# Patient Record
Sex: FEMALE | Race: OTHER | NOT HISPANIC OR LATINO
[De-identification: names, ages, dates, MRNs, and addresses within clinical notes are randomized per-mention and may not be internally consistent; named-entity substitution may affect disease eponyms.]

---

## 2017-07-20 PROBLEM — Z00.00 ENCOUNTER FOR PREVENTIVE HEALTH EXAMINATION: Status: ACTIVE | Noted: 2017-07-20

## 2017-08-10 ENCOUNTER — APPOINTMENT (OUTPATIENT)
Dept: HEART AND VASCULAR | Facility: CLINIC | Age: 57
End: 2017-08-10
Payer: MEDICARE

## 2017-08-10 VITALS
HEIGHT: 63.5 IN | WEIGHT: 118 LBS | BODY MASS INDEX: 20.65 KG/M2 | SYSTOLIC BLOOD PRESSURE: 124 MMHG | DIASTOLIC BLOOD PRESSURE: 80 MMHG

## 2017-08-10 VITALS — TEMPERATURE: 98.4 F | HEART RATE: 64 BPM

## 2017-08-10 DIAGNOSIS — Z01.810 ENCOUNTER FOR PREPROCEDURAL CARDIOVASCULAR EXAMINATION: ICD-10-CM

## 2017-08-10 DIAGNOSIS — E78.5 HYPERLIPIDEMIA, UNSPECIFIED: ICD-10-CM

## 2017-08-10 DIAGNOSIS — Z87.898 PERSONAL HISTORY OF OTHER SPECIFIED CONDITIONS: ICD-10-CM

## 2017-08-10 DIAGNOSIS — Z82.49 FAMILY HISTORY OF ISCHEMIC HEART DISEASE AND OTHER DISEASES OF THE CIRCULATORY SYSTEM: ICD-10-CM

## 2017-08-10 DIAGNOSIS — M81.0 AGE-RELATED OSTEOPOROSIS W/OUT CURRENT PATHOLOGICAL FRACTURE: ICD-10-CM

## 2017-08-10 DIAGNOSIS — R06.09 OTHER FORMS OF DYSPNEA: ICD-10-CM

## 2017-08-10 DIAGNOSIS — J45.909 UNSPECIFIED ASTHMA, UNCOMPLICATED: ICD-10-CM

## 2017-08-10 DIAGNOSIS — R94.39 ABNORMAL RESULT OF OTHER CARDIOVASCULAR FUNCTION STUDY: ICD-10-CM

## 2017-08-10 DIAGNOSIS — R07.89 OTHER CHEST PAIN: ICD-10-CM

## 2017-08-10 PROCEDURE — 99205 OFFICE O/P NEW HI 60 MIN: CPT | Mod: 25

## 2017-08-10 RX ORDER — ALBUTEROL SULFATE 90 UG/1
108 (90 BASE) AEROSOL, METERED RESPIRATORY (INHALATION)
Qty: 8 | Refills: 0 | Status: ACTIVE | COMMUNITY
Start: 2017-06-23

## 2017-08-10 RX ORDER — BUDESONIDE AND FORMOTEROL FUMARATE DIHYDRATE 160; 4.5 UG/1; UG/1
160-4.5 AEROSOL RESPIRATORY (INHALATION)
Qty: 10 | Refills: 0 | Status: ACTIVE | COMMUNITY
Start: 2016-12-22

## 2017-08-10 RX ORDER — IBANDRONATE SODIUM 150 MG/1
150 TABLET ORAL
Qty: 1 | Refills: 0 | Status: DISCONTINUED | COMMUNITY
Start: 2017-03-07

## 2017-08-10 RX ORDER — GABAPENTIN 100 MG/1
100 CAPSULE ORAL
Qty: 30 | Refills: 0 | Status: ACTIVE | COMMUNITY
Start: 2017-06-13

## 2017-08-10 RX ORDER — BACLOFEN 10 MG/1
10 TABLET ORAL
Qty: 90 | Refills: 0 | Status: ACTIVE | COMMUNITY
Start: 2017-06-13

## 2017-08-10 RX ORDER — APREMILAST 30 MG/1
30 TABLET, FILM COATED ORAL
Qty: 180 | Refills: 0 | Status: ACTIVE | COMMUNITY
Start: 2017-03-31

## 2017-08-10 RX ORDER — NYSTATIN 100000 [USP'U]/G
100000 CREAM TOPICAL
Qty: 60 | Refills: 0 | Status: ACTIVE | COMMUNITY
Start: 2017-07-26

## 2017-08-10 RX ORDER — ALENDRONATE SODIUM 70 MG/1
70 TABLET ORAL
Refills: 0 | Status: ACTIVE | COMMUNITY

## 2017-08-10 RX ORDER — FLUOCINONIDE 0.5 MG/G
0.05 CREAM TOPICAL
Qty: 60 | Refills: 0 | Status: ACTIVE | COMMUNITY
Start: 2017-06-13

## 2017-08-10 RX ORDER — MELOXICAM 15 MG/1
15 TABLET ORAL
Qty: 30 | Refills: 0 | Status: ACTIVE | COMMUNITY
Start: 2017-06-13

## 2017-08-10 RX ORDER — DOXYCYCLINE HYCLATE 100 MG/1
100 TABLET, DELAYED RELEASE ORAL
Qty: 28 | Refills: 0 | Status: DISCONTINUED | COMMUNITY
Start: 2017-02-07

## 2017-08-10 RX ORDER — SECUKINUMAB 150 MG/ML
150 INJECTION SUBCUTANEOUS
Qty: 4 | Refills: 0 | Status: ACTIVE | COMMUNITY
Start: 2016-12-13

## 2017-09-15 ENCOUNTER — APPOINTMENT (OUTPATIENT)
Dept: RADIOLOGY | Facility: CLINIC | Age: 57
End: 2017-09-15

## 2017-09-19 ENCOUNTER — APPOINTMENT (OUTPATIENT)
Dept: ORTHOPEDIC SURGERY | Facility: CLINIC | Age: 57
End: 2017-09-19

## 2017-10-17 ENCOUNTER — APPOINTMENT (OUTPATIENT)
Dept: ORTHOPEDIC SURGERY | Facility: CLINIC | Age: 57
End: 2017-10-17
Payer: MEDICARE

## 2017-10-17 ENCOUNTER — APPOINTMENT (OUTPATIENT)
Dept: RADIOLOGY | Facility: CLINIC | Age: 57
End: 2017-10-17

## 2017-10-17 VITALS — HEIGHT: 63.5 IN | BODY MASS INDEX: 20.65 KG/M2 | WEIGHT: 118 LBS

## 2017-10-17 DIAGNOSIS — Z87.09 PERSONAL HISTORY OF OTHER DISEASES OF THE RESPIRATORY SYSTEM: ICD-10-CM

## 2017-10-17 DIAGNOSIS — M19.012 PRIMARY OSTEOARTHRITIS, LEFT SHOULDER: ICD-10-CM

## 2017-10-17 DIAGNOSIS — M19.011 PRIMARY OSTEOARTHRITIS, RIGHT SHOULDER: ICD-10-CM

## 2017-10-17 DIAGNOSIS — Z87.2 PERSONAL HISTORY OF DISEASES OF THE SKIN AND SUBCUTANEOUS TISSUE: ICD-10-CM

## 2017-10-17 DIAGNOSIS — Z82.62 FAMILY HISTORY OF OSTEOPOROSIS: ICD-10-CM

## 2017-10-17 DIAGNOSIS — Z87.19 PERSONAL HISTORY OF OTHER DISEASES OF THE DIGESTIVE SYSTEM: ICD-10-CM

## 2017-10-17 DIAGNOSIS — Z87.39 PERSONAL HISTORY OF OTHER DISEASES OF THE MUSCULOSKELETAL SYSTEM AND CONNECTIVE TISSUE: ICD-10-CM

## 2017-10-17 DIAGNOSIS — L40.50 ARTHROPATHIC PSORIASIS, UNSPECIFIED: ICD-10-CM

## 2017-10-17 PROCEDURE — 99203 OFFICE O/P NEW LOW 30 MIN: CPT

## 2017-10-17 RX ORDER — ATORVASTATIN CALCIUM 10 MG/1
10 TABLET, FILM COATED ORAL
Qty: 30 | Refills: 0 | Status: DISCONTINUED | COMMUNITY
Start: 2017-03-03 | End: 2017-10-17

## 2017-10-17 RX ORDER — RABEPRAZOLE SODIUM 20 MG/1
20 TABLET, DELAYED RELEASE ORAL
Qty: 30 | Refills: 0 | Status: DISCONTINUED | COMMUNITY
Start: 2017-06-13 | End: 2017-10-17

## 2017-10-17 RX ORDER — DICLOFENAC SODIUM 10 MG/G
1 GEL TOPICAL
Qty: 100 | Refills: 0 | Status: DISCONTINUED | COMMUNITY
Start: 2017-07-24 | End: 2017-10-17

## 2017-10-18 PROBLEM — Z87.19 HISTORY OF ESOPHAGEAL REFLUX: Status: RESOLVED | Noted: 2017-10-17 | Resolved: 2017-10-18

## 2017-10-18 PROBLEM — Z87.09 HISTORY OF ASTHMA: Status: RESOLVED | Noted: 2017-10-17 | Resolved: 2017-10-18

## 2017-10-18 PROBLEM — Z82.62 FAMILY HISTORY OF OSTEOPOROSIS: Status: ACTIVE | Noted: 2017-10-17

## 2017-10-18 PROBLEM — Z87.39 HISTORY OF ARTHRITIS: Status: RESOLVED | Noted: 2017-10-17 | Resolved: 2017-10-18

## 2017-10-18 RX ORDER — RABEPRAZOLE SODIUM 20 MG/1
TABLET, DELAYED RELEASE ORAL
Refills: 0 | Status: ACTIVE | COMMUNITY

## 2017-11-04 PROBLEM — M19.011 PRIMARY OSTEOARTHRITIS OF RIGHT SHOULDER: Status: ACTIVE | Noted: 2017-10-17

## 2017-11-04 PROBLEM — M19.012 PRIMARY OSTEOARTHRITIS OF LEFT SHOULDER: Status: ACTIVE | Noted: 2017-10-17

## 2017-11-04 PROBLEM — L40.50 PSORIATIC ARTHRITIS: Status: ACTIVE | Noted: 2017-10-18

## 2024-02-21 ENCOUNTER — EMERGENCY (EMERGENCY)
Facility: HOSPITAL | Age: 64
LOS: 1 days | Discharge: HOME | End: 2024-02-21
Attending: EMERGENCY MEDICINE | Admitting: EMERGENCY MEDICINE
Payer: MEDICARE

## 2024-02-21 VITALS
HEART RATE: 60 BPM | SYSTOLIC BLOOD PRESSURE: 123 MMHG | TEMPERATURE: 98 F | OXYGEN SATURATION: 100 % | DIASTOLIC BLOOD PRESSURE: 63 MMHG | RESPIRATION RATE: 18 BRPM

## 2024-02-21 VITALS
DIASTOLIC BLOOD PRESSURE: 74 MMHG | TEMPERATURE: 98 F | OXYGEN SATURATION: 100 % | RESPIRATION RATE: 15 BRPM | HEART RATE: 63 BPM | SYSTOLIC BLOOD PRESSURE: 121 MMHG

## 2024-02-21 DIAGNOSIS — Z96.642 PRESENCE OF LEFT ARTIFICIAL HIP JOINT: Chronic | ICD-10-CM

## 2024-02-21 DIAGNOSIS — Z96.652 PRESENCE OF LEFT ARTIFICIAL KNEE JOINT: Chronic | ICD-10-CM

## 2024-02-21 DIAGNOSIS — Z96.641 PRESENCE OF RIGHT ARTIFICIAL HIP JOINT: Chronic | ICD-10-CM

## 2024-02-21 DIAGNOSIS — Z96.651 PRESENCE OF RIGHT ARTIFICIAL KNEE JOINT: Chronic | ICD-10-CM

## 2024-02-21 DIAGNOSIS — Z90.49 ACQUIRED ABSENCE OF OTHER SPECIFIED PARTS OF DIGESTIVE TRACT: Chronic | ICD-10-CM

## 2024-02-21 LAB
ALBUMIN SERPL ELPH-MCNC: 3.6 G/DL — SIGNIFICANT CHANGE UP (ref 3.3–5)
ALP SERPL-CCNC: 117 U/L — SIGNIFICANT CHANGE UP (ref 40–120)
ALT FLD-CCNC: 17 U/L — SIGNIFICANT CHANGE UP (ref 4–33)
ANION GAP SERPL CALC-SCNC: 10 MMOL/L — SIGNIFICANT CHANGE UP (ref 7–14)
AST SERPL-CCNC: 13 U/L — SIGNIFICANT CHANGE UP (ref 4–32)
BASOPHILS # BLD AUTO: 0.05 K/UL — SIGNIFICANT CHANGE UP (ref 0–0.2)
BASOPHILS NFR BLD AUTO: 0.7 % — SIGNIFICANT CHANGE UP (ref 0–2)
BILIRUB SERPL-MCNC: 0.2 MG/DL — SIGNIFICANT CHANGE UP (ref 0.2–1.2)
BUN SERPL-MCNC: 24 MG/DL — HIGH (ref 7–23)
CALCIUM SERPL-MCNC: 9.2 MG/DL — SIGNIFICANT CHANGE UP (ref 8.4–10.5)
CHLORIDE SERPL-SCNC: 106 MMOL/L — SIGNIFICANT CHANGE UP (ref 98–107)
CO2 SERPL-SCNC: 24 MMOL/L — SIGNIFICANT CHANGE UP (ref 22–31)
CREAT SERPL-MCNC: 0.47 MG/DL — LOW (ref 0.5–1.3)
CRP SERPL-MCNC: <3 MG/L — SIGNIFICANT CHANGE UP
EGFR: 106 ML/MIN/1.73M2 — SIGNIFICANT CHANGE UP
EOSINOPHIL # BLD AUTO: 0.25 K/UL — SIGNIFICANT CHANGE UP (ref 0–0.5)
EOSINOPHIL NFR BLD AUTO: 3.3 % — SIGNIFICANT CHANGE UP (ref 0–6)
ERYTHROCYTE [SEDIMENTATION RATE] IN BLOOD: 33 MM/HR — HIGH (ref 4–25)
FLUAV AG NPH QL: SIGNIFICANT CHANGE UP
FLUBV AG NPH QL: SIGNIFICANT CHANGE UP
GLUCOSE SERPL-MCNC: 94 MG/DL — SIGNIFICANT CHANGE UP (ref 70–99)
HCT VFR BLD CALC: 39.2 % — SIGNIFICANT CHANGE UP (ref 34.5–45)
HGB BLD-MCNC: 12.4 G/DL — SIGNIFICANT CHANGE UP (ref 11.5–15.5)
IANC: 4.02 K/UL — SIGNIFICANT CHANGE UP (ref 1.8–7.4)
IMM GRANULOCYTES NFR BLD AUTO: 0.5 % — SIGNIFICANT CHANGE UP (ref 0–0.9)
LYMPHOCYTES # BLD AUTO: 2.75 K/UL — SIGNIFICANT CHANGE UP (ref 1–3.3)
LYMPHOCYTES # BLD AUTO: 36.7 % — SIGNIFICANT CHANGE UP (ref 13–44)
MCHC RBC-ENTMCNC: 28.3 PG — SIGNIFICANT CHANGE UP (ref 27–34)
MCHC RBC-ENTMCNC: 31.6 GM/DL — LOW (ref 32–36)
MCV RBC AUTO: 89.5 FL — SIGNIFICANT CHANGE UP (ref 80–100)
MONOCYTES # BLD AUTO: 0.39 K/UL — SIGNIFICANT CHANGE UP (ref 0–0.9)
MONOCYTES NFR BLD AUTO: 5.2 % — SIGNIFICANT CHANGE UP (ref 2–14)
NEUTROPHILS # BLD AUTO: 4.02 K/UL — SIGNIFICANT CHANGE UP (ref 1.8–7.4)
NEUTROPHILS NFR BLD AUTO: 53.6 % — SIGNIFICANT CHANGE UP (ref 43–77)
NRBC # BLD: 0 /100 WBCS — SIGNIFICANT CHANGE UP (ref 0–0)
NRBC # FLD: 0 K/UL — SIGNIFICANT CHANGE UP (ref 0–0)
PLATELET # BLD AUTO: 354 K/UL — SIGNIFICANT CHANGE UP (ref 150–400)
POTASSIUM SERPL-MCNC: 4.7 MMOL/L — SIGNIFICANT CHANGE UP (ref 3.5–5.3)
POTASSIUM SERPL-SCNC: 4.7 MMOL/L — SIGNIFICANT CHANGE UP (ref 3.5–5.3)
PROT SERPL-MCNC: 6.7 G/DL — SIGNIFICANT CHANGE UP (ref 6–8.3)
RBC # BLD: 4.38 M/UL — SIGNIFICANT CHANGE UP (ref 3.8–5.2)
RBC # FLD: 15 % — HIGH (ref 10.3–14.5)
RSV RNA NPH QL NAA+NON-PROBE: SIGNIFICANT CHANGE UP
SARS-COV-2 RNA SPEC QL NAA+PROBE: DETECTED
SODIUM SERPL-SCNC: 140 MMOL/L — SIGNIFICANT CHANGE UP (ref 135–145)
WBC # BLD: 7.5 K/UL — SIGNIFICANT CHANGE UP (ref 3.8–10.5)
WBC # FLD AUTO: 7.5 K/UL — SIGNIFICANT CHANGE UP (ref 3.8–10.5)

## 2024-02-21 PROCEDURE — 73700 CT LOWER EXTREMITY W/O DYE: CPT | Mod: 26,RT,MA

## 2024-02-21 PROCEDURE — 99285 EMERGENCY DEPT VISIT HI MDM: CPT | Mod: FS

## 2024-02-21 RX ORDER — KETOROLAC TROMETHAMINE 30 MG/ML
15 SYRINGE (ML) INJECTION ONCE
Refills: 0 | Status: DISCONTINUED | OUTPATIENT
Start: 2024-02-21 | End: 2024-02-21

## 2024-02-21 RX ORDER — MORPHINE SULFATE 50 MG/1
2 CAPSULE, EXTENDED RELEASE ORAL ONCE
Refills: 0 | Status: DISCONTINUED | OUTPATIENT
Start: 2024-02-21 | End: 2024-02-21

## 2024-02-21 RX ORDER — SODIUM CHLORIDE 9 MG/ML
500 INJECTION INTRAMUSCULAR; INTRAVENOUS; SUBCUTANEOUS ONCE
Refills: 0 | Status: COMPLETED | OUTPATIENT
Start: 2024-02-21 | End: 2024-02-21

## 2024-02-21 RX ORDER — ACETAMINOPHEN 500 MG
1000 TABLET ORAL ONCE
Refills: 0 | Status: DISCONTINUED | OUTPATIENT
Start: 2024-02-21 | End: 2024-02-24

## 2024-02-21 RX ADMIN — Medication 15 MILLIGRAM(S): at 12:23

## 2024-02-21 RX ADMIN — SODIUM CHLORIDE 500 MILLILITER(S): 9 INJECTION INTRAMUSCULAR; INTRAVENOUS; SUBCUTANEOUS at 10:36

## 2024-02-21 NOTE — ED PROVIDER NOTE - OBJECTIVE STATEMENT
64 y/o female with PMH psoriatic arthritis, rheumatoid arthritis, asthma, and GERD with PSH of b/l hip and knee replacement, R hip replacement 11/2023 presents with atraumatic right hip pain since this morning that radiates to the groin. Pt reports she was woken up at 5am with a sharp pain that she has never felt before but denies any recent falls or trauma to the area and reports she does not move in her sleep due to arthritis in multiple joints. At baseline patient reports she has not been able to walk without 2 people supporting her during physical therapy since the hip surgery in November and has not tried to ambulate this morning. Patient reports she was told she had a right hip fracture 1/19/24 and has been receiving tylenol and tramadol in her nursing facility she resides in for pain. Patient is unsure what medication she was given this morning for pain. Patient admits she had a recent URI and finished course of unknown antibiotics 4 days ago and course of steroids ended yesterday. Patient reports she currently has nasal congestion and productive cough. Pt also admits to right 2nd digit toe numbness that has been "going on for a while". Pt denies fever, chest pain, shortness of breath, abdominal pain, headache, dizziness, nausea, vomiting, diarrhea, back pain, numbness or tingling in the extremities. Pt reports she was woken up at 5am with a sharp pain that she has never felt before but denies any recent falls or trauma to the area and reports she does not move in her sleep due to arthritis in multiple joints. At baseline patient reports she has not been able to walk without 2 people supporting her during physical therapy since the hip surgery in November and has not tried to ambulate this morning. Patient reports she was told she had a right hip fracture 1/19/24 and has been receiving tylenol and tramadol in her nursing facility she resides in for pain. Patient is unsure what medication she was given this morning for pain. Patient admits she had a recent URI and finished course of unknown antibiotics 4 days ago and course of steroids ended yesterday. Patient reports she currently has nasal congestion and productive cough. Pt also admits to right 2nd digit toe numbness that has been "going on for a while". Pt denies fever, chest pain, shortness of breath, abdominal pain, headache, dizziness, nausea, vomiting, diarrhea, back pain, numbness or tingling in the extremities.    Supervising Statement (LUZ Baumann): I have personally seen and examined this patient.  I have fully participated in the care of this patient. I have reviewed all pertinent clinical information, including history, physical exam, plan and PA student’s note and agree as noted.  62 y/o female with PMH psoriatic arthritis, rheumatoid arthritis, asthma, and GERD with PSH of b/l hip and knee replacement, R hip replacement 11/2023 presents with atraumatic right hip pain since this morning that radiates to the groin. atraumatic, starting at 5AM. on tylenol/tramadol for pain at the rehab facility. denies fevers, but was recently tx for URI with steroids/antibiotics. Pt not ambulatory, and currently on no treatment for her psoriatic/rheumatoid arthritis.   on exam, pt mildly contacted. + ttp with gentle palpation overlying R greater trochanter. limited ROM 2/2 pain. scar visualized overlying L hip. no overlying erythema or purulent drainage. NSR no murmurs. lungs CTA b/l. will check labs, ESR/CRP, CT to evaluate for fracture vs hardware malfunction, analgesics, reassess

## 2024-02-21 NOTE — ED PROVIDER NOTE - NSFOLLOWUPINSTRUCTIONS_ED_ALL_ED_FT
You were seen in our department for Hip pain.  Your CT scan is concerning for possible loosening hardware, although we spoke w/ Dr. Bishop, who does not recommend any intervention at this time.   FOllow up for your scheduled appt with Dr. Bishop at his [place] office at 10AM on 2/29.  continue home medications.  Follow up with your PMD in 48-72 hours for further monitoring.  if you develop any chest pain, dizziness, high fevers, weakness, numbness, tingling, vision changes, or any worsening symptoms return to our ED for evaluation.

## 2024-02-21 NOTE — ED PROVIDER NOTE - CLINICAL SUMMARY MEDICAL DECISION MAKING FREE TEXT BOX
Lauren: Glenn. hip replacements. Awoke w/ non-traumatic R hip pain. No F. Recent URI. Just finished short steroid and ABx course. Lives in nursing home. Check CT. If neg., then trial of walking. Consider also reactive arthritis after URI. Check CBC, CMP, ESR, CRP, uric acid. Pain meds.

## 2024-02-21 NOTE — ED PROVIDER NOTE - NSICDXPASTSURGICALHX_GEN_ALL_CORE_FT
PAST SURGICAL HISTORY:  History of appendectomy     S/P hip replacement, left     S/P hip replacement, right     S/P total knee replacement, left     S/P total knee replacement, right

## 2024-02-21 NOTE — ED ADULT NURSE NOTE - NSFALLHARMRISKINTERV_ED_ALL_ED

## 2024-02-21 NOTE — ED PROVIDER NOTE - HIV OFFER
DISPLAY PLAN FREE TEXT DISPLAY PLAN FREE TEXT DISPLAY PLAN FREE TEXT DISPLAY PLAN FREE TEXT DISPLAY PLAN FREE TEXT DISPLAY PLAN FREE TEXT DISPLAY PLAN FREE TEXT Opt out

## 2024-02-21 NOTE — ED PROVIDER NOTE - CARE PROVIDER_API CALL
REY BECKER  [address] [place] [address] [address] [place] [address]  Phone: [phone]  Fax: [phone]  Follow Up Time: Routine

## 2024-02-21 NOTE — ED ADULT NURSE NOTE - NSFALLCONCLUSION_ED_ALL_ED
OUTPATIENT PROGRESS NOTE    HISTORY  The patient is a 77 year old female who comes in for transitional care management visit.     Patient comes today with her friend Alicia.     Patient presented to ED on 1/8/2024 with right sided abdominal pain with history of appendectomy and a cholecystectomy.No abnormal bowel movements.  No urinary complaints.  Patient had right-sided pain and states feels like someone is stabbing her.  No fevers or chills.    ED work up  EKG evaluated by myself with the following findings:  Ventricular paced rhythm with a rate of 77 beats per minute.  Negative Sgarbossa criteria.   Urinalysis negative.   Creatinine slightly elevated other labs unremarkable.     CT abdomen/pelvis. 1/8/2024  IMPRESSION:  1. Diffuse wall thickening of the colon with surrounding inflammatory  stranding and small amount of fluid in the right pericolic gutter. The  findings suggest diffuse colitis.  2. Staghorn calculi of both kidneys with chronic left hydronephrosis and  new right hydronephrosis and hydroureter. There is also an 8 mm stone at  the right ureterovesicular junction.  3. Stable cyst on the right ovary/adnexa compared to studies of 6/12/2022.  4. Moderate gastric distention. There are no findings of bowel obstruction.  5. Chronic bronchiectatic changes and mild peribronchial thickening in the  lower lobes. This has improved in appearance compared to 8/4/2023. There is  no focal consolidation.  6. Area of masslike fullness in the left upper quadrant measuring 3.8 x 2.7  cm (image 22 of series 3 may represent a gastric diverticulum or even  possible mass in the wall of the stomach. This is likely separate from the  adrenal gland. It can be seen on studies from 6/12/2022 and was better  identified on the postcontrast images.    Hospital course admitted on 1/7/2024 to 1/16/2024.     Patient stay complicated with bilateral renal stones with chronic left hydronephrosis, UTI and right pyelonephritis.  Status  post bilateral ureteral stent placement 1/8/2024.  Patient also complaining of colitis and she was started on IV antibiotics.   Repeat CT of abdomen and pelvis with contrast 1/13/2024 reveals resolution of CT changes of RLQ colitis. patient was discharged home with vancomycin 125 mg QID for 2 weeks. Patient did develop some diarrhea inpatient, she was checked for C-diff which was negative. Patient inflammatory markers were elevated initially with CRP at 3.3 and improved by discharge at 0.7. blood culture were negative after 5 days.  Patient returned back on Afib medications with no recurrence of her colitis.  Patient will follow up with Urology outpatient. Patient reports that she has not followed up with Dr Sharp at this time, she is scheduled 1/29/24.     Blood cultures negative   Urine culture multiple bacteria  CRP was elevated but subsequently improved   Leukocytosis resolved.  No anemia.    CT abdomen and pelvis 1/13/2024  IMPRESSION:  1. Colon is no longer thick walled.   2. Staghorn calculi in both kidneys measuring up to 1.5 cm on the right and  3.0 cm on the left.  3. Bilateral nephroureteral stents. Mild persistent bilateral  hydronephrosis.  4. Prior cholecystectomy with moderate intrahepatic and extrahepatic  biliary ductal dilatation, likely reservoir effect.  5. Unchanged left upper quadrant adrenal cyst versus gastric diverticulum  measuring 2.5 x 4.5. This is unchanged dating back to at least 2010 and is  therefore benign    History of inappropriate sinus tachycardia. Patient was having palpation and racing heart sensation. Patient was noted to have increase heart rate on device check. Patient was started on Corlanor 5 mg bid by EP. Patient tolerating medication well and encouraged to follow up next month as schedule.     History of Adnexal mass enlargement. Patient elected not to proceed with further evaluation or work up. She was recommended to consult with gyn and declines.      MEDICATIONS  Medications were reviewed and updated today.  Current Outpatient Medications   Medication Sig    vancomycin (VANCOCIN) 125 MG capsule Take 1 capsule by mouth 4 times daily for 14 days.    levothyroxine 150 MCG tablet TAKE 1 TABLET BY MOUTH DAILY. EXCEPT 1/2 TABLET SAT/SUN    aspirin-acetaminophen-caffeine (EXCEDRIN MIGRAINE) 250-250-65 MG per tablet Take 1 tablet by mouth every 6 hours as needed for Pain.    metoPROLOL succinate (TOPROL-XL) 50 MG 24 hr tablet TAKE 1.5 TABLETS BY MOUTH EVERY 12 HOURS.    ivabradine (CORLANOR) 5 MG tablet Take 1 tablet by mouth in the morning and 1 tablet in the evening.    rabeprazole (ACIPHEX) 20 MG tablet TAKE 1 TABLET (20 MG TOTAL) BY MOUTH ONCE DAILY. INDICATIONS: GASTROESOPHAGEAL REFLUX DISEASE    AZO-CRANBERRY PO Take 1 tablet by mouth daily.    Entresto  MG per tablet TAKE 1 TABLET BY MOUTH IN THE MORNING AND IN THE EVENING    Omega-3 Fatty Acids (Fish Oil) 1200 MG capsule Take 1 capsule by mouth in the morning and 1 capsule in the evening.    calcium carbonate-cholecalciferol (Calcium + D3) 600-800 MG-UNIT tablet Take 1 tablet by mouth in the morning and 1 tablet in the evening.    acetaminophen (TYLENOL) 500 MG tablet Take 1 tablet by mouth every 6 hours as needed for Pain or Fever.     Current Facility-Administered Medications   Medication    DENOSumab (PROLIA) SubQ injection 60 mg       ALLERGIES  Allergies as of 01/23/2024 - Reviewed 01/23/2024   Allergen Reaction Noted    Chocolate   (food or med) Nausea & Vomiting 09/29/2011    Penicillins RASH 09/29/2011    Protonix HEADACHES        REVIEW OF SYSTEMS  See above for details.   Fatigue has improved   She is feeling better   General: No fatigue. No fever. No chills.  Cardiac: No chest pain. No palpitation. No syncope or near syncope. No orthopnea or paroxysmal nocturnal dyspnea.  Respiratory: No cough. No wheezing. No hemoptysis. No shortness of breath.  Gastrointestinal: No nausea. No vomiting. No  abdominal pain. No diarrhea. No rectal bleeding. No dysphagia.  Genitourinary: No hematuria. No flank pain. No dysuria. No irritative urinary symptoms. Normal stream.  Neurologic: No headache. No unilateral sensory or motor dysfunction. No dysarthria. No double vision.  Endocrine: No weight change. No dry skin. No palpitation. No hair loss. No constipation.  Skin: No rash, ulcers, or pruritus.  ENT: No runny nose. No sneezing. No nasal drainage.  Eyes: No drainage. No pain.  Mental: No anxiety or depression.    PHYSICAL EXAM  Vitals: Blood pressure 134/86, pulse 86, height 5' 6\" (1.676 m), weight (!) 40.8 kg (90 lb), SpO2 92 %.  Wt Readings from Last 3 Encounters:   01/23/24 (!) 40.8 kg (90 lb)   01/16/24 (!) 39.1 kg (86 lb 3.2 oz)   10/05/23 40.4 kg (89 lb)   Defibrillator ICD left upper chest subcutaneously.  ENT: Anicteric sclerae. No pallor. Oral mucosa moist. No ulcers, thrush or erythema.  General: Patient is alert, awake, oriented x3, not in acute distress, pleasant.  Neck: Neck supple. No lymphadenopathy. No jugular venous distention or bruits.  Cardiac: Regular rate and rhythm. No murmur, rubs or gallops. No extra sounds. Point of maximal impulse is normal.  Chest: Clear. No wheezing. No rales. Good air entry. No rhonchi.  Abdomen: Soft. Nontender. Nondistended. No hepatosplenomegaly.  Legs: Normal. No edema. No deep vein thrombosis.  Skin: Warm and dry with no rashes.    LABORATORY  I have reviewed the pertinent laboratory tests. These are the pertinent findings:  Lab Results   Component Value Date    HGBA1C 5.6 09/13/2021    CHOLESTEROL 195 09/07/2022    HDL 66 09/07/2022    CALCLDL 115 09/07/2022    TRIGLYCERIDE 70 09/07/2022    POTASSIUM 3.6 01/16/2024    CREATININE 0.63 01/16/2024    AST 21 01/08/2024    BILIRUBIN 0.9 01/08/2024    TSH 0.498 01/12/2024    WBC 7.1 01/14/2024    HGB 12.1 01/14/2024     01/14/2024    VITD25 73.8 09/07/2022     ASSESSMENT:  1. Med[employer]onic BiV ICD, generator change  withLV epicardial lead 9/14/2021,  ( non MRI, compatible, capped RV) the patient will keep appointment with cardiology and Dr. King from electrophysiology.  Doing very well.  She has no palpitations chest pain shortness O breath.   2. Ventricular tachycardia (CMS/HCC) no recurrence.  Monitor closely by Cardiology.  Currently maintained on metoprolol XL 75 mg b.i.d..  Corlanor 5 mg b.i.d..  Entresto  b.i.d..   3. Chronic systolic congestive heart failure (CMD) she has treatment as above.  Clinically euvolemic.  No orthopnea no PND.  Furosemide 20 mg p.r.n. only.   4. PAF (paroxysmal atrial fibrillation) (CMD) as above.   5. Colitis treated with vancomycin 125 mg q.i.d. for 14 days.  She will finish next week.  She has no diarrhea nausea vomiting or abdominal pain.   6. Adnexal mass this was discussed with the patient.  She is not interested in pursuing this.  She canceled the appointments with gyn few times.  She decided with the sister not to pursue this issue in the future.   7. Acute pyelonephritis treated with antibiotic.  Resolved.  Underwent bilateral stent placement by Urology.  She has an appointment next week with Dr. Sharp and she will keep appointment.   8. MELISSA (obstructive sleep apnea) she was seen Dr. Vasquez.  Sleep apnea testing was negative.  She is maintained on mandibular appliance as well as oxygen 2 L at nighttime.   9. Calculus of kidney as above underwent stent placement follow-up with Urology.   10.    Hypothyroidism.  Maintained on levothyroxine this will be maintained.    PLAN:    Return in about 3 months (around 4/23/2024).     Patient Instructions   Continue the oral vancomycin antibiotic to complete the course  Other medications stay the same  Low-salt diet  Use a walker  Fall precautions discussed with you today  Keep appointment with urology and cardiology as previously scheduled  See me in 3 months for wellness examination earlier if any issues    All the above was discussed  with the patient in detail; questions were answered to the patient's satisfaction.  Patient left the office in stable condition with verbal and written instructions.        Fall with Harm Risk

## 2024-02-21 NOTE — ED PROVIDER NOTE - ATTENDING APP SHARED VISIT CONTRIBUTION OF CARE
I performed a face-to-face evaluation of the patient and performed a history and physical examination. I agree with the history and physical examination. If this was a PA visit, I personally saw the patient with the PA and performed a substantive portion of the visit including all aspects of the medical decision making.     Bilat. hip replacements. Awoke w/ non-traumatic R hip pain. No F. Recent URI. Just finished short steroid and ABx course. Lives in nursing home. Check CT. If neg., then trial of walking. Consider also reactive arthritis after URI. Check CBC, CMP, ESR, CRP, uric acid. Pain meds.

## 2024-02-21 NOTE — ED PROVIDER NOTE - PATIENT PORTAL LINK FT
You can access the FollowMyHealth Patient Portal offered by [facility] by registering at the following website: [email or web address]. By joining [employer]’s FollowMyHealth portal, you will also be able to view your health information using other applications (apps) compatible with our system.

## 2024-02-21 NOTE — ED PROVIDER NOTE - NSICDXPASTMEDICALHX_GEN_ALL_CORE_FT
PAST MEDICAL HISTORY:  Asthma     GERD (gastroesophageal reflux disease)     Psoriatic arthritis     Rheumatoid arthritis

## 2024-02-21 NOTE — ED ADULT TRIAGE NOTE - CHIEF COMPLAINT QUOTE
presents [facility] C/O right hip pain. S/P hip replacement  11/23. No complaints of chest pain, headache, nausea, dizziness, vomiting  SOB, fever, chills verbalized. PhxRA COPD asthma

## 2024-02-21 NOTE — PROVIDER CONTACT NOTE (OTHER) - ASSESSMENT
Arranged [facility] [phone]. Trip# [id number]. P/U 7 PM. Pt is returning back to [facility] at [address], [place] [address]. KARYNA spoke with Christina ECKERT at [phone]. KARYNA notified RN that pt is Covid Positive, RN stated she will have an Isolation room for pt upon arrival.

## 2024-02-21 NOTE — ED ADULT NURSE NOTE - OBJECTIVE STATEMENT
float rn note ; Pt  awake and alert x 4 with co generalized chronic pain from her arthritis s/p right hip surgery , area dry and intake incision looks well. Pts skin intake . Pt co pain to the right hip area .  Pt is bed bound. . Prima fit placed to pt for urine collection.  Pt with no co sob or chest pain vs wnl, pt with no fever no chills.  labs collected by area RN.

## 2024-02-21 NOTE — ED PROVIDER NOTE - MUSCULOSKELETAL, MLM
R hip tender to palpation. Scar visualized no erythema, warmth, discharge. ROM limited at baseline due to hx of arthritis R hip tender to palpation. Surgical scar visualized no erythema, warmth, discharge. ROM limited at baseline due to hx of arthritis

## 2024-02-21 NOTE — ED PROVIDER NOTE - PROGRESS NOTE DETAILS
Lary MUÑOZ: (delayed entry), CT revealing possible loosening hardware. case d/w ortho, recommending calling Dr. Bishop patients primary surgeon. spoke w/  at office, [phone] who will call Dr Bishop or on call physician, left # for hospital to call back.   pt updated on plan, and results. RN notified me that patient is extremely frustrated and would like to leave. I explained all results and what patient;s currently medical plan is going forward, and brought copies of patients results and explained in detail. awaiting call back from ortho. of note, pt also COVID +, advised to wear mask at all times. Natasha MUÑOZ: spoke w/ natasha  from Dr. Traore office, faxed report for them to visualize CT results [phone]. Report was discussed w/ Dr. Bishop, in which natasha then recalled me back, states no need for transfer, no surgical intervention based on the report at this time, and would recommend pt to follow up for her scheduled appt on 2/29 at [place] office location at 10AM. Natasha states she will contact patient to make her aware of the situation and upcoming appt scheduled as well. Lary Cagle PA: Case d/w Social work mariya, states patient is able to return despite being COVID +, ambulance called for transportation back to rehab center. Lary MUÑOZ: spoke w/ patient and updated on Dr. Traore recommendations at this point. pt verbally abusive, nonredirectable. Explains that she is very dissatisfied that she was not given assistance in order to drink her OJ and feed her, and not checked on multiple times. also expresses that she feels "uncomfortable" not being seen by an infectious disease specialist for her COVID diagnosis. I explained to patient that she has been symptomatic for over 2 weeks, and ultimately her oxygen levels are within normal range, and is minimally symptomatic and does not warrant an infectious disease consult at this time. Pt explains dissatisfaction with not checking up on her more often, and called the [id number] extension asking " am I living in a concentration camp?" and states that she is very unhappy here.   SW consulted to check with [facility] in order to confirm if they will accept her back despite her being COVID +, and if so, will return to rehab facility or be admitted if not.

## 2024-02-21 NOTE — ED PROVIDER NOTE - CARE PROVIDERS DIRECT ADDRESSES
[email or web address]

## 2024-03-13 PROBLEM — K21.9 GASTRO-ESOPHAGEAL REFLUX DISEASE WITHOUT ESOPHAGITIS: Chronic | Status: ACTIVE | Noted: 2024-02-21

## 2024-03-13 PROBLEM — L40.50 ARTHROPATHIC PSORIASIS, UNSPECIFIED: Chronic | Status: ACTIVE | Noted: 2024-02-21

## 2024-03-13 PROBLEM — J45.909 UNSPECIFIED ASTHMA, UNCOMPLICATED: Chronic | Status: ACTIVE | Noted: 2024-02-21

## 2024-03-13 PROBLEM — M06.9 RHEUMATOID ARTHRITIS, UNSPECIFIED: Chronic | Status: ACTIVE | Noted: 2024-02-21

## 2024-03-18 ENCOUNTER — APPOINTMENT (OUTPATIENT)
Dept: ORTHOPEDIC SURGERY | Facility: CLINIC | Age: 64
End: 2024-03-18

## 2024-04-11 NOTE — ED ADULT TRIAGE NOTE - SOURCE OF INFORMATION
HISTORY OF PRESENT ILLNESS:  Fallon Williamson is a 70 y.o. female who presents today for a follow up visit. She is scheduled for a laparoscopic sacrocolpopexy with supracervical hysterectomy on 4/15/24. She has been trying to use boric acid and thinks that her pH is out of balance and states there is a smell.           Past Medical History  She has no past medical history on file.    Surgical History  She has a past surgical history that includes CT angio coronary art with heartflow if score >30% (2016).     Social History  She has no history on file for tobacco use, alcohol use, and drug use.    Family History  No family history on file.     Allergies  Patient has no known allergies.      A comprehensive 10+ review of systems was negative except for: see hpi                          PHYSICAL EXAMINATION:  BP Readings from Last 3 Encounters:   No data found for BP      Wt Readings from Last 3 Encounters:   No data found for Wt      BMI: There is no height or weight on file to calculate BMI.  BSA: There is no height or weight on file to calculate BSA.  HEENT: Normocephalic, atraumatic, PER EOMI, nonicteric, trachea normal, thyroid normal, oropharynx normal.  CARDIAC: regular rate & rhythm, S1 & S2 normal.  No heaves, thrills, gallops or murmurs.  LUNGS: Clear to auscultation, no spinal or CV tenderness.  EXTREMITIES: No evidence of cyanosis, clubbing or edema.    POP-Q (in supine position):        Aa 1     Ba 1     C -3              gh 3     pb 3     tvl 8              Ap -2     Bp -2     D -3           Assessment:  Fallon Williamson is a 70 y.o.   who presents for a bladder prolapse follow up visit     Stage 2 UVP   -Recommended physical floor therapy   -has #4 ring with support currently  -Discussed she most likely will need pelvic floor therapy after surgery given her discomfort with the pessary  -UDS did not demonstrate stress incontinence, she will not need a sling    -Rx flomax  Planning on  laparoscopic sacrocolpopexy with supracervical hysterectomy on 4/15/24    Follow up 2 weeks post op with Zuleika and 6 weeks with Dr. Carolyn ALFONSO:  Start estradiol     All questions and concerns were answered and addressed.  The patient expressed understanding and agrees with the plan.       Jd Leon MD    Scribe Attestation  By signing my name below, I, Jesusitapascale Lackey, Scrib[employer]   attest that this documentation has been prepared under the direction and in the presence of Jd Leon MD.     Patient
